# Patient Record
Sex: FEMALE | Race: WHITE
[De-identification: names, ages, dates, MRNs, and addresses within clinical notes are randomized per-mention and may not be internally consistent; named-entity substitution may affect disease eponyms.]

---
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
## 2019-01-14 ENCOUNTER — HOSPITAL ENCOUNTER (EMERGENCY)
Dept: HOSPITAL 5 - ED | Age: 19
Discharge: HOME | End: 2019-01-14
Payer: COMMERCIAL

## 2019-01-14 VITALS — DIASTOLIC BLOOD PRESSURE: 74 MMHG | SYSTOLIC BLOOD PRESSURE: 124 MMHG

## 2019-01-14 DIAGNOSIS — Z20.2: ICD-10-CM

## 2019-01-14 DIAGNOSIS — K52.9: Primary | ICD-10-CM

## 2019-01-14 DIAGNOSIS — N30.00: ICD-10-CM

## 2019-01-14 LAB
BACTERIA #/AREA URNS HPF: (no result) /HPF
BILIRUB UR QL STRIP: (no result)
BLOOD UR QL VISUAL: (no result)
MUCOUS THREADS #/AREA URNS HPF: (no result) /HPF
PH UR STRIP: 6 [PH] (ref 5–7)
RBC #/AREA URNS HPF: 14 /HPF (ref 0–6)
UROBILINOGEN UR-MCNC: < 2 MG/DL (ref ?–2)
WBC #/AREA URNS HPF: 97 /HPF (ref 0–6)

## 2019-01-14 PROCEDURE — 81001 URINALYSIS AUTO W/SCOPE: CPT

## 2019-01-14 PROCEDURE — 81025 URINE PREGNANCY TEST: CPT

## 2019-01-14 NOTE — EMERGENCY DEPARTMENT REPORT
<BRITTNEY COLES - Last Filed: 19 11:27>





ED General Adult HPI





- General


Chief complaint: Nausea/Vomiting/Diarrhea


Stated complaint: VAGINAL PAIN/DIARRHEA/VOMITING


Time Seen by Provider: 19 11:21


Source: patient


Mode of arrival: Ambulatory


Limitations: No Limitations





- History of Present Illness


Initial comments: 





Patient is 18 years old female with no significant past medical history.  

Patient presented to the ER complaining of nausea vomiting and diarrhea for the 

last 3 days.  She also stated that she's been having some runny nose cough and 

congestion.  Patient also stating that she been having dysuria and increased 

urinary frequency.  Patient denied any abdominal pain or fever.











- Related Data


                                  Previous Rx's











 Medication  Instructions  Recorded  Last Taken  Type


 


Cefdinir 300 mg PO BID #20 capsule 17 Unknown Rx


 


methylPREDNISolone [Medrol] 4 mg PO DAILY #1 tab.ds.pk 17 Unknown Rx


 


Clindamycin [Clindamycin CAP] 300 mg PO Q8H 10 Days #30 cap 18 Unknown Rx


 


Sulfamethoxazole/Trimethoprim 1 each PO BID #6 tablet 18 Unknown Rx





[Bactrim DS TAB]    


 


Nitrofurantoin Macrocrystal 100 mg PO BID #10 capsule 19 Unknown Rx





[Nitrofurantoin]    


 


Valacyclovir HCl [Valtrex] 1,000 mg PO BID #20 tablet 19 Unknown Rx











                                    Allergies











Allergy/AdvReac Type Severity Reaction Status Date / Time


 


No Known Allergies Allergy   Verified 17 12:56














ED Review of Systems


Comment: All other systems reviewed and negative


Constitutional: denies: chills, fever


ENT: congestion


Respiratory: cough.  denies: orthopnea, shortness of breath, SOB with exertion


Gastrointestinal: nausea, vomiting, diarrhea.  denies: abdominal pain, 

constipation, hematemesis, melena, hematochezia


Genitourinary: urgency, dysuria, frequency.  denies: hematuria, discharge, 

abnormal menses, dyspareunia


Neurological: denies: headache, weakness, numbness, paresthesias, confusion, 

abnormal gait





ED Past Medical Hx





- Past Medical History


Previous Medical History?: Yes


Additional medical history: OBESE-cbp bc of her weight per her pcp





- Surgical History


Past Surgical History?: Yes


Additional Surgical History: 





- Social History


Smoking Status: Never Smoker


Substance Use Type: None





- Medications


Home Medications: 


                                Home Medications











 Medication  Instructions  Recorded  Confirmed  Last Taken  Type


 


Cefdinir 300 mg PO BID #20 capsule 17  Unknown Rx


 


methylPREDNISolone [Medrol] 4 mg PO DAILY #1 tab.ds.pk 17  Unknown Rx


 


Clindamycin [Clindamycin CAP] 300 mg PO Q8H 10 Days #30 cap 18  Unknown Rx


 


Sulfamethoxazole/Trimethoprim 1 each PO BID #6 tablet 18  Unknown Rx





[Bactrim DS TAB]     


 


Nitrofurantoin Macrocrystal 100 mg PO BID #10 capsule 19  Unknown Rx





[Nitrofurantoin]     


 


Valacyclovir HCl [Valtrex] 1,000 mg PO BID #20 tablet 19  Unknown Rx














ED Physical Exam





- General


Limitations: No Limitations


General appearance: alert, in no apparent distress





- Head


Head exam: Present: atraumatic, normocephalic, normal inspection





- Eye


Eye exam: Present: normal appearance





- ENT


ENT exam: Present: normal exam, normal orophraynx, mucous membranes moist





- Neck


Neck exam: Present: normal inspection, full ROM.  Absent: tenderness, 

meningismus, lymphadenopathy, thyromegaly





- Respiratory


Respiratory exam: Present: normal lung sounds bilaterally





- Cardiovascular


Cardiovascular Exam: Present: regular rate, normal rhythm, normal heart sounds





- GI/Abdominal


GI/Abdominal exam: Present: soft, normal bowel sounds.  Absent: distended, 

tenderness, guarding, rebound, rigid, organomegaly, mass, bruit, pulsatile mass





- Extremities Exam


Extremities exam: Present: normal inspection, full ROM, normal capillary refill.

  Absent: tenderness, pedal edema, calf tenderness





- Back Exam


Back exam: Present: normal inspection, full ROM.  Absent: tenderness, CVA 

tenderness (R), CVA tenderness (L), muscle spasm, paraspinal tenderness, 

vertebral tenderness





- Neurological Exam


Neurological exam: Present: alert, oriented X3, CN II-XII intact, normal gait, 

reflexes normal





- Skin


Skin exam: Present: warm, intact, normal color





ED Disposition


Clinical Impression: 


 Gastroenteritis, Exposure to STD





UTI (urinary tract infection)


Qualifiers:


 Urinary tract infection type: acute cystitis Hematuria presence: without 

hematuria Qualified Code(s): N30.00 - Acute cystitis without hematuria





Disposition:  TO HOME OR SELFCARE


Is pt being admited?: No


Condition: Stable


Instructions:  Urinary Tract Infection in Women (ED), Gastroenteritis (ED)


Additional Instructions: 


Increase fluid intake to 1L to 2L daily.


Complete full course of antibiotics as prescribed.


Avoid drinking alcohol while taking antibiotics and for 24 hours after 

completion.


Follow up with primary care provider in 2-3 days.


Prescriptions: 


Nitrofurantoin Macrocrystal [Nitrofurantoin] 100 mg PO BID #10 capsule


Valacyclovir HCl [Valtrex] 1,000 mg PO BID #20 tablet


Referrals: 


[facility] [Outside] - 3-5 Days


[facility] [Outside] - 3-5 Days


[facility] [Outside] - 3-5 Days


Forms:  Work/School Release Form(ED)





<GARY KUHN - Last Filed: 19 15:34>





ED Review of Systems


ROS: 


Stated complaint: VAGINAL PAIN/DIARRHEA/VOMITING


Other details as noted in HPI








ED Physical Exam





- 


External exam: Present: lesions (multiple vesicular lesions to left internal 

labia manora, tenderness).  Absent: erythema, swelling, lacerations, ecchymosis,

 bleeding





ED Course


                                   Vital Signs











  19





  09:51


 


Temperature 98.8 F


 


Pulse Rate 119 H


 


Respiratory 18





Rate 


 


Blood Pressure 133/90


 


O2 Sat by Pulse 97





Oximetry 














ED Medical Decision Making





- Lab Data








                                   Lab Results











  19 Range/Units





  10:15 


 


Urine Color  Yellow  (Yellow)  


 


Urine Turbidity  Slightly-cloudy  (Clear)  


 


Urine pH  6.0  (5.0-7.0)  


 


Ur Specific Gravity  1.017  (1.003-1.030)  


 


Urine Protein  30 mg/dl  (Negative)  mg/dL


 


Urine Glucose (UA)  Neg  (Negative)  mg/dL


 


Urine Ketones  Neg  (Negative)  mg/dL


 


Urine Blood  Neg  (Negative)  


 


Urine Nitrite  Neg  (Negative)  


 


Urine Bilirubin  Neg  (Negative)  


 


Urine Urobilinogen  < 2.0  (<2.0)  mg/dL


 


Ur Leukocyte Esterase  Lg  (Negative)  


 


Urine WBC (Auto)  97.0 H  (0.0-6.0)  /HPF


 


Urine RBC (Auto)  14.0  (0.0-6.0)  /HPF


 


U Epithel Cells (Auto)  13.0  (0-13.0)  /HPF


 


Urine Bacteria (Auto)  1+  (Negative)  /HPF


 


Urine Mucus  Few  /HPF


 


Urine HCG, Qual  Negative  (Negative)  














- Medical Decision Making





Pelvic exam performed.  Vesicular rash susceptible of herpes simplex 2.  Will 

start antiviral and refer to primary care provider for STD screening.


Critical care attestation.: 


If time is entered above; I have spent that time in minutes in the direct care 

of this critically ill patient, excluding procedure time.








ED Disposition


Time of Disposition: 12:04

## 2019-10-01 ENCOUNTER — HOSPITAL ENCOUNTER (EMERGENCY)
Dept: HOSPITAL 5 - ED | Age: 19
Discharge: HOME | End: 2019-10-01
Payer: SELF-PAY

## 2019-10-01 VITALS — DIASTOLIC BLOOD PRESSURE: 78 MMHG | SYSTOLIC BLOOD PRESSURE: 133 MMHG

## 2019-10-01 DIAGNOSIS — F32.9: Primary | ICD-10-CM

## 2019-10-01 DIAGNOSIS — F43.10: ICD-10-CM

## 2019-10-01 DIAGNOSIS — F12.10: ICD-10-CM

## 2019-10-01 DIAGNOSIS — Z79.899: ICD-10-CM

## 2019-10-01 LAB
BACTERIA #/AREA URNS HPF: (no result) /HPF
BASOPHILS # (AUTO): 0.1 K/MM3 (ref 0–0.1)
BASOPHILS NFR BLD AUTO: 0.7 % (ref 0–1.8)
BENZODIAZEPINES SCREEN,URINE: (no result)
BILIRUB UR QL STRIP: (no result)
BLOOD UR QL VISUAL: (no result)
BUN SERPL-MCNC: 13 MG/DL (ref 7–17)
BUN/CREAT SERPL: 19 %
CALCIUM SERPL-MCNC: 8.9 MG/DL (ref 8.4–10.2)
EOSINOPHIL # BLD AUTO: 0.1 K/MM3 (ref 0–0.4)
EOSINOPHIL NFR BLD AUTO: 1.5 % (ref 0–4.3)
HCT VFR BLD CALC: 39.8 % (ref 30.3–42.9)
HEMOLYSIS INDEX: 5
HGB BLD-MCNC: 12.7 GM/DL (ref 10.1–14.3)
LYMPHOCYTES # BLD AUTO: 2.1 K/MM3 (ref 1.2–5.4)
LYMPHOCYTES NFR BLD AUTO: 29.9 % (ref 13.4–35)
MCHC RBC AUTO-ENTMCNC: 32 % (ref 30–34)
MCV RBC AUTO: 86 FL (ref 79–97)
METHADONE SCREEN,URINE: (no result)
MONOCYTES # (AUTO): 0.4 K/MM3 (ref 0–0.8)
MONOCYTES % (AUTO): 5.1 % (ref 0–7.3)
OPIATE SCREEN,URINE: (no result)
PH UR STRIP: 6 [PH] (ref 5–7)
PLATELET # BLD: 287 K/MM3 (ref 140–440)
PROT UR STRIP-MCNC: (no result) MG/DL
RBC # BLD AUTO: 4.64 M/MM3 (ref 3.65–5.03)
RBC #/AREA URNS HPF: 4 /HPF (ref 0–6)
UROBILINOGEN UR-MCNC: < 2 MG/DL (ref ?–2)
WBC #/AREA URNS HPF: 7 /HPF (ref 0–6)

## 2019-10-01 PROCEDURE — 85025 COMPLETE CBC W/AUTO DIFF WBC: CPT

## 2019-10-01 PROCEDURE — 36415 COLL VENOUS BLD VENIPUNCTURE: CPT

## 2019-10-01 PROCEDURE — 80320 DRUG SCREEN QUANTALCOHOLS: CPT

## 2019-10-01 PROCEDURE — 99284 EMERGENCY DEPT VISIT MOD MDM: CPT

## 2019-10-01 PROCEDURE — 80307 DRUG TEST PRSMV CHEM ANLYZR: CPT

## 2019-10-01 PROCEDURE — 81001 URINALYSIS AUTO W/SCOPE: CPT

## 2019-10-01 PROCEDURE — G0480 DRUG TEST DEF 1-7 CLASSES: HCPCS

## 2019-10-01 PROCEDURE — 80048 BASIC METABOLIC PNL TOTAL CA: CPT

## 2019-10-01 NOTE — EMERGENCY DEPARTMENT REPORT
ED Psych HPI





- General


Chief Complaint: Psych


Stated Complaint: PSYCH EVAL


Time Seen by Provider: 10/01/19 10:44


Source: patient


Mode of arrival: Ambulatory





- History of Present Illness


Initial Comments: 





17 yo AA F pt with hx of Depression complains of suicidal thoughts x 1 month. 

She reports hx of attempted suicide at age 12. She denies any specific plan of 

suicide, homicidal ideation, or hallucinations. She states she was previously on

lexapro years ago and states it worked well for her. 


MD Complaint: suicidal ideation, feels depressed


-: Gradual


Associated Psychiatric Symptoms: depression, suicidal ideation


History of same: Yes


Quality: constant, getting worse


Improves With: none


Worsens With: none


Associated Symptoms: denies other symptoms


Treatments Prior to Arrival: none


If Self Harm: admits thoughts of





- Related Data


                                  Previous Rx's











 Medication  Instructions  Recorded  Last Taken  Type


 


Cefdinir 300 mg PO BID #20 capsule 17 Unknown Rx


 


methylPREDNISolone [Medrol] 4 mg PO DAILY #1 tab.ds.pk 17 Unknown Rx


 


Clindamycin [Clindamycin CAP] 300 mg PO Q8H 10 Days #30 cap 18 Unknown Rx


 


Sulfamethoxazole/Trimethoprim 1 each PO BID #6 tablet 18 Unknown Rx





[Bactrim DS TAB]    


 


Nitrofurantoin Macrocrystal 100 mg PO BID #10 capsule 19 Unknown Rx





[Nitrofurantoin]    


 


Valacyclovir HCl [Valtrex] 1,000 mg PO BID #20 tablet 19 Unknown Rx











                                    Allergies











Allergy/AdvReac Type Severity Reaction Status Date / Time


 


No Known Allergies Allergy   Verified 17 12:56














ED Review of Systems


ROS: 


Stated complaint: PSYCH EVAL


Other details as noted in HPI





Constitutional: denies: chills, fever


Eyes: denies: eye pain, eye discharge, vision change


ENT: denies: ear pain, throat pain


Respiratory: denies: cough, shortness of breath, wheezing


Cardiovascular: denies: chest pain, palpitations


Endocrine: no symptoms reported


Gastrointestinal: denies: abdominal pain, nausea, diarrhea


Genitourinary: denies: urgency, dysuria, discharge


Musculoskeletal: denies: back pain, joint swelling, arthralgia


Skin: denies: rash, lesions


Neurological: denies: headache, weakness, paresthesias


Psychiatric: depression, suicidal thoughts.  denies: anxiety, auditory 

hallucinations, visual hallucinations, homicidal thoughts


Hematological/Lymphatic: denies: easy bleeding, easy bruising





ED Past Medical Hx





- Past Medical History


Previous Medical History?: Yes


Hx Psychiatric Treatment: Yes (Borderline Personality Disorder, depression, 

PTSD)


Additional medical history: OBESE-cbp bc of her weight per her pcp





- Surgical History


Past Surgical History?: Yes


Additional Surgical History: 





- Social History


Smoking Status: Never Smoker


Substance Use Type: Alcohol, Marijuana





- Medications


Home Medications: 


                                Home Medications











 Medication  Instructions  Recorded  Confirmed  Last Taken  Type


 


Cefdinir 300 mg PO BID #20 capsule 17  Unknown Rx


 


methylPREDNISolone [Medrol] 4 mg PO DAILY #1 tab.ds.pk 17  Unknown Rx


 


Clindamycin [Clindamycin CAP] 300 mg PO Q8H 10 Days #30 cap 18  Unknown Rx


 


Sulfamethoxazole/Trimethoprim 1 each PO BID #6 tablet 18  Unknown Rx





[Bactrim DS TAB]     


 


Nitrofurantoin Macrocrystal 100 mg PO BID #10 capsule 19  Unknown Rx





[Nitrofurantoin]     


 


Valacyclovir HCl [Valtrex] 1,000 mg PO BID #20 tablet 19  Unknown Rx














ED Physical Exam





- General


Limitations: No Limitations


General appearance: alert, in no apparent distress





- Head


Head exam: Present: atraumatic, normocephalic





- Respiratory


Respiratory exam: Present: normal lung sounds bilaterally.  Absent: respiratory 

distress





- Cardiovascular


Cardiovascular Exam: Present: regular rate, normal rhythm, normal heart sounds. 

 Absent: systolic murmur, diastolic murmur, rubs, gallop





- Extremities Exam


Extremities exam: Present: full ROM





- Neurological Exam


Neurological exam: Present: alert, oriented X3





- Psychiatric


Psychiatric exam: Present: normal affect, depressed, anxious, suicidal ideation.

  Absent: agitated, manic, homicidal ideation





ED Course


                                   Vital Signs











  10/01/19





  09:50


 


Temperature 99.5 F


 


Pulse Rate 100 H


 


Respiratory 20





Rate 


 


Blood Pressure 133/78


 


O2 Sat by Pulse 99





Oximetry 














ED Medical Decision Making





- Lab Data


Result diagrams: 


                                 10/01/19 10:03





                                 10/01/19 10:03





- Medical Decision Making





18-year-old female patient with history of depression and is here for suicidal 

thoughts.  Patient was assessed by LORE Talyor, who states patient does

 not meet inpatient criteria.  Mr. Dixon states patient signed a safety 

contract and was provided with outpatient resources.  Patient to be discharged 

home.


Critical care attestation.: 


If time is entered above; I have spent that time in minutes in the direct care 

of this critically ill patient, excluding procedure time.








ED Disposition


Clinical Impression: 


 Depression, Suicidal thoughts





Disposition: DC-01 TO HOME OR SELFCARE


Is pt being admited?: No


Does the pt Need Aspirin: No


Time of Disposition: 16:19
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
